# Patient Record
Sex: FEMALE | Race: WHITE | NOT HISPANIC OR LATINO | Employment: FULL TIME
[De-identification: names, ages, dates, MRNs, and addresses within clinical notes are randomized per-mention and may not be internally consistent; named-entity substitution may affect disease eponyms.]

---

## 2022-02-13 ENCOUNTER — HEALTH MAINTENANCE LETTER (OUTPATIENT)
Age: 62
End: 2022-02-13

## 2022-02-21 ENCOUNTER — OFFICE VISIT (OUTPATIENT)
Dept: INTERNAL MEDICINE | Facility: CLINIC | Age: 62
End: 2022-02-21
Payer: COMMERCIAL

## 2022-02-21 VITALS
BODY MASS INDEX: 44.16 KG/M2 | HEIGHT: 64 IN | RESPIRATION RATE: 20 BRPM | HEART RATE: 86 BPM | WEIGHT: 258.7 LBS | SYSTOLIC BLOOD PRESSURE: 137 MMHG | OXYGEN SATURATION: 100 % | DIASTOLIC BLOOD PRESSURE: 79 MMHG | TEMPERATURE: 97.1 F

## 2022-02-21 DIAGNOSIS — E03.9 ACQUIRED HYPOTHYROIDISM: ICD-10-CM

## 2022-02-21 DIAGNOSIS — Z00.00 PREVENTATIVE HEALTH CARE: Primary | ICD-10-CM

## 2022-02-21 DIAGNOSIS — D50.9 IRON DEFICIENCY ANEMIA, UNSPECIFIED IRON DEFICIENCY ANEMIA TYPE: ICD-10-CM

## 2022-02-21 DIAGNOSIS — E66.01 MORBID OBESITY (H): ICD-10-CM

## 2022-02-21 LAB
BASOPHILS # BLD AUTO: 0.1 10E3/UL (ref 0–0.2)
BASOPHILS NFR BLD AUTO: 1 %
EOSINOPHIL # BLD AUTO: 0.1 10E3/UL (ref 0–0.7)
EOSINOPHIL NFR BLD AUTO: 2 %
ERYTHROCYTE [DISTWIDTH] IN BLOOD BY AUTOMATED COUNT: 18.3 % (ref 10–15)
HCT VFR BLD AUTO: 35.8 % (ref 35–47)
HGB BLD-MCNC: 10.8 G/DL (ref 11.7–15.7)
LYMPHOCYTES # BLD AUTO: 2.4 10E3/UL (ref 0.8–5.3)
LYMPHOCYTES NFR BLD AUTO: 34 %
MCH RBC QN AUTO: 22.1 PG (ref 26.5–33)
MCHC RBC AUTO-ENTMCNC: 30.2 G/DL (ref 31.5–36.5)
MCV RBC AUTO: 73 FL (ref 78–100)
MONOCYTES # BLD AUTO: 0.5 10E3/UL (ref 0–1.3)
MONOCYTES NFR BLD AUTO: 7 %
NEUTROPHILS # BLD AUTO: 4.1 10E3/UL (ref 1.6–8.3)
NEUTROPHILS NFR BLD AUTO: 57 %
PLATELET # BLD AUTO: 386 10E3/UL (ref 150–450)
RBC # BLD AUTO: 4.89 10E6/UL (ref 3.8–5.2)
WBC # BLD AUTO: 7.1 10E3/UL (ref 4–11)

## 2022-02-21 PROCEDURE — 83550 IRON BINDING TEST: CPT | Performed by: INTERNAL MEDICINE

## 2022-02-21 PROCEDURE — 80050 GENERAL HEALTH PANEL: CPT | Performed by: INTERNAL MEDICINE

## 2022-02-21 PROCEDURE — 80061 LIPID PANEL: CPT | Performed by: INTERNAL MEDICINE

## 2022-02-21 PROCEDURE — 84439 ASSAY OF FREE THYROXINE: CPT | Performed by: INTERNAL MEDICINE

## 2022-02-21 PROCEDURE — 36415 COLL VENOUS BLD VENIPUNCTURE: CPT | Performed by: INTERNAL MEDICINE

## 2022-02-21 PROCEDURE — 82728 ASSAY OF FERRITIN: CPT | Performed by: INTERNAL MEDICINE

## 2022-02-21 PROCEDURE — 99386 PREV VISIT NEW AGE 40-64: CPT | Performed by: INTERNAL MEDICINE

## 2022-02-21 ASSESSMENT — ENCOUNTER SYMPTOMS
PALPITATIONS: 0
PARESTHESIAS: 0
DYSURIA: 0
EYE PAIN: 0
NAUSEA: 0
ABDOMINAL PAIN: 0
HEMATOCHEZIA: 0
BREAST MASS: 0
ARTHRALGIAS: 0
MYALGIAS: 0
HEADACHES: 0
SORE THROAT: 0
FEVER: 0
CHILLS: 0
HEARTBURN: 1
JOINT SWELLING: 0
COUGH: 0
DIARRHEA: 0
SHORTNESS OF BREATH: 0
FREQUENCY: 0
DIZZINESS: 0
HEMATURIA: 0
NERVOUS/ANXIOUS: 0
CONSTIPATION: 0
WEAKNESS: 0

## 2022-02-21 NOTE — PROGRESS NOTES
SUBJECTIVE:   CC: Taryn Salter is an 61 year old woman who presents for preventive health visit.     Non-fasting. Establish care.    Patient has been advised of split billing requirements and indicates understanding: Yes   Answers for HPI/ROS submitted by the patient on 2/21/2022  Frequency of exercise:: None  Getting at least 3 servings of Calcium per day:: Yes  Diet:: Regular (no restrictions)  Taking medications regularly:: Yes  Medication side effects:: None  Bi-annual eye exam:: NO  Dental care twice a year:: Yes  Sleep apnea or symptoms of sleep apnea:: None  abdominal pain: No  Blood in stool: No  Blood in urine: No  chest pain: No  chills: No  congestion: No  constipation: No  cough: No  diarrhea: No  dizziness: No  ear pain: No  eye pain: No  nervous/anxious: No  fever: No  frequency: No  genital sores: No  headaches: No  hearing loss: No  heartburn: Yes  arthralgias: No  joint swelling: No  peripheral edema: No  mood changes: No  myalgias: No  nausea: No  dysuria: No  palpitations: No  Skin sensation changes: No  sore throat: No  urgency: No  rash: No  shortness of breath: No  visual disturbance: No  weakness: No  pelvic pain: No  vaginal bleeding: No  vaginal discharge: No  tenderness: No  breast mass: No  breast discharge: No  Additional concerns today:: Yes      Today's PHQ-2 Score:   PHQ-2 ( 1999 [employer]) 2/21/2022   Q1: Little interest or pleasure in doing things 1   Q2: Feeling down, depressed or hopeless 1   PHQ-2 Score 2   Q1: Little interest or pleasure in doing things Several days   Q2: Feeling down, depressed or hopeless Several days   PHQ-2 Score 2       Abuse: Current or Past(Physical, Sexual or Emotional)- No  Do you feel safe in your environment? Yes    Have you ever done Advance Care Planning? (For example, a Health Directive, POLST, or a discussion with a medical provider or your loved ones about your wishes): No, advance care planning information given to patient to review.  Advanced  care planning was discussed at today's visit.    Social History     Tobacco Use     Smoking status: Never Smoker     Smokeless tobacco: Not on file   Substance Use Topics     Alcohol use: No     If you drink alcohol do you typically have >3 drinks per day or >7 drinks per week? No                     Reviewed orders with patient.  Reviewed health maintenance and updated orders accordingly - Yes  BP Readings from Last 3 Encounters:   02/21/22 137/79   08/29/13 113/63   07/13/13 130/81    Wt Readings from Last 3 Encounters:   02/21/22 117.3 kg (258 lb 11.2 oz)   08/29/13 129.5 kg (285 lb 7.9 oz)   07/10/13 136.1 kg (300 lb)                    Breast CA Risk Assessment (FHS-7) 2/21/2022   Do you have a family history of breast, colon, or ovarian cancer? No / Unknown         Mammogram Screening: Recommended mammography every 1-2 years with patient discussion and risk factor consideration  Pertinent mammograms are reviewed under the imaging tab.    Pertinent mammograms are reviewed under the imaging tab.  History of abnormal Pap smear: Status post benign hysterectomy. Health Maintenance and Surgical History updated.     Reviewed and updated as needed this visit by clinical staff                  Reviewed and updated as needed this visit by Provider                     ROS:  CONSTITUTIONAL: NEGATIVE for fever, chills, change in weight  INTEGUMENTARY/SKIN: NEGATIVE for worrisome rashes, moles or lesions  EYES: NEGATIVE for vision changes or irritation  ENT: NEGATIVE for ear, mouth and throat problems  RESP: NEGATIVE for significant cough or SOB  BREAST: NEGATIVE for masses, tenderness or discharge  CV: NEGATIVE for chest pain, palpitations or peripheral edema  GI: NEGATIVE for nausea, abdominal pain, heartburn, or change in bowel habits  : NEGATIVE for unusual urinary or vaginal symptoms. No vaginal bleeding.  MUSCULOSKELETAL: NEGATIVE for significant arthralgias or myalgia  NEURO: NEGATIVE for weakness, dizziness or  "paresthesias  PSYCHIATRIC: NEGATIVE for changes in mood or affect     OBJECTIVE:   There were no vitals taken for this visit.  EXAM:  GENERAL: healthy, alert and no distress  EYES: Eyes grossly normal to inspection, PERRL and conjunctivae and sclerae normal  NECK: no adenopathy, no asymmetry, masses, or scars and thyroid normal to palpation  RESP: lungs clear to auscultation - no rales, rhonchi or wheezes  BREAST: normal without masses, tenderness or nipple discharge and no palpable axillary masses or adenopathy  CV: regular rate and rhythm, normal S1 S2, no S3 or S4, no murmur, click or rub, no peripheral edema and peripheral pulses strong  ABDOMEN: soft, nontender, no hepatosplenomegaly, no masses and bowel sounds normal  MS: no gross musculoskeletal defects noted, no edema  SKIN: no suspicious lesions or rashes  NEURO: Normal strength and tone, mentation intact and speech normal  PSYCH: mentation appears normal, affect normal/bright      ASSESSMENT/PLAN:   (Z00.00) Preventative health care  (primary encounter diagnosis)  Comment:    Plan: CBC with platelets and differential, TSH with         free T4 reflex, Comprehensive metabolic panel         (BMP + Alb, Alk Phos, ALT, AST, Total. Bili,         TP), Lipid Profile (Chol, Trig, HDL, LDL calc),        *MA Screening Digital Bilateral, Adult Gastro         Ref - Procedure Only             (E03.9) Acquired hypothyroidism  Comment:    Plan:      (E66.01) Morbid obesity (H)  Comment:    Plan:      Patient has been advised of split billing requirements and indicates understanding: Yes  COUNSELING:   Reviewed preventive health counseling, as reflected in patient instructions       Regular exercise       Healthy diet/nutrition    Estimated body mass index is 48.98 kg/m  as calculated from the following:    Height as of 8/26/13: 1.626 m (5' 4.02\").    Weight as of 8/29/13: 129.5 kg (285 lb 7.9 oz).    Weight management plan: Patient referred to endocrine and/or weight " management specialty    She reports that she has never smoked. She does not have any smokeless tobacco history on file.      Counseling Resources:  ATP IV Guidelines  Pooled Cohorts Equation Calculator  Breast Cancer Risk Calculator  BRCA-Related Cancer Risk Assessment: FHS-7 Tool  FRAX Risk Assessment  ICSI Preventive Guidelines  Dietary Guidelines for Americans, 2010  USDA's MyPlate  ASA Prophylaxis  Lung CA Screening    Nehal Pitts MD  [facility]

## 2022-02-22 LAB
ALBUMIN SERPL-MCNC: 3.7 G/DL (ref 3.4–5)
ALP SERPL-CCNC: 155 U/L (ref 40–150)
ALT SERPL W P-5'-P-CCNC: 21 U/L (ref 0–50)
ANION GAP SERPL CALCULATED.3IONS-SCNC: 11 MMOL/L (ref 3–14)
AST SERPL W P-5'-P-CCNC: 12 U/L (ref 0–45)
BILIRUB SERPL-MCNC: 0.4 MG/DL (ref 0.2–1.3)
BUN SERPL-MCNC: 11 MG/DL (ref 7–30)
CALCIUM SERPL-MCNC: 9 MG/DL (ref 8.5–10.1)
CHLORIDE BLD-SCNC: 105 MMOL/L (ref 94–109)
CHOLEST SERPL-MCNC: 206 MG/DL
CO2 SERPL-SCNC: 21 MMOL/L (ref 20–32)
CREAT SERPL-MCNC: 0.53 MG/DL (ref 0.52–1.04)
FASTING STATUS PATIENT QL REPORTED: NO
FERRITIN SERPL-MCNC: 4 NG/ML (ref 8–252)
GFR SERPL CREATININE-BSD FRML MDRD: >90 ML/MIN/1.73M2
GLUCOSE BLD-MCNC: 119 MG/DL (ref 70–99)
HDLC SERPL-MCNC: 114 MG/DL
IRON SATN MFR SERPL: 4 % (ref 15–46)
IRON SERPL-MCNC: 20 UG/DL (ref 35–180)
LDLC SERPL CALC-MCNC: 79 MG/DL
NONHDLC SERPL-MCNC: 92 MG/DL
POTASSIUM BLD-SCNC: 3.6 MMOL/L (ref 3.4–5.3)
PROT SERPL-MCNC: 7.5 G/DL (ref 6.8–8.8)
SODIUM SERPL-SCNC: 137 MMOL/L (ref 133–144)
T4 FREE SERPL-MCNC: 0.97 NG/DL (ref 0.76–1.46)
TIBC SERPL-MCNC: 528 UG/DL (ref 240–430)
TRIGL SERPL-MCNC: 63 MG/DL
TSH SERPL DL<=0.005 MIU/L-ACNC: 5.92 MU/L (ref 0.4–4)

## 2022-02-24 ENCOUNTER — MYC MEDICAL ADVICE (OUTPATIENT)
Dept: INTERNAL MEDICINE | Facility: CLINIC | Age: 62
End: 2022-02-24
Payer: COMMERCIAL

## 2022-02-24 NOTE — TELEPHONE ENCOUNTER
"Levothyroxine is patient reported/historical.     From 2/21/2022 lab result note:     \"Please call her and let her know she is anemic and very deficient in Iron. She should start OTC iron tab ferrous sulfate and take them twice a day and recheck cbc in 3 months. She will likely need to be on the iron for a year to build up her iron stores. Labs have been ordered. her other labs were all within acceptable limits.\"    Najma BETANCOURT RN   [facility]      "

## 2022-02-24 NOTE — TELEPHONE ENCOUNTER
"Please see patient's most recent mychart message below regarding Levothyroxine dose.  States she hasn't taken the medication for \"quite awhile\".    Please e-scribe prescription.  "

## 2022-03-14 ENCOUNTER — HOSPITAL ENCOUNTER (OUTPATIENT)
Dept: MAMMOGRAPHY | Facility: CLINIC | Age: 62
Discharge: HOME | End: 2022-03-14
Attending: INTERNAL MEDICINE | Admitting: INTERNAL MEDICINE
Payer: COMMERCIAL

## 2022-03-14 DIAGNOSIS — Z00.00 PREVENTATIVE HEALTH CARE: ICD-10-CM

## 2022-03-14 PROCEDURE — 77067 SCR MAMMO BI INCL CAD: CPT

## 2022-10-16 ENCOUNTER — HEALTH MAINTENANCE LETTER (OUTPATIENT)
Age: 62
End: 2022-10-16

## 2023-03-26 ENCOUNTER — HEALTH MAINTENANCE LETTER (OUTPATIENT)
Age: 63
End: 2023-03-26

## 2024-06-01 ENCOUNTER — HEALTH MAINTENANCE LETTER (OUTPATIENT)
Age: 64
End: 2024-06-01

## 2024-07-22 ENCOUNTER — HOSPITAL ENCOUNTER (EMERGENCY)
Facility: CLINIC | Age: 64
Discharge: HOME | End: 2024-07-22
Attending: STUDENT IN AN ORGANIZED HEALTH CARE EDUCATION/TRAINING PROGRAM | Admitting: STUDENT IN AN ORGANIZED HEALTH CARE EDUCATION/TRAINING PROGRAM
Payer: COMMERCIAL

## 2024-07-22 ENCOUNTER — OFFICE VISIT (OUTPATIENT)
Dept: URGENT CARE | Facility: URGENT CARE | Age: 64
End: 2024-07-22
Payer: COMMERCIAL

## 2024-07-22 VITALS
BODY MASS INDEX: 46.69 KG/M2 | OXYGEN SATURATION: 98 % | DIASTOLIC BLOOD PRESSURE: 66 MMHG | SYSTOLIC BLOOD PRESSURE: 108 MMHG | RESPIRATION RATE: 14 BRPM | WEIGHT: 272 LBS | TEMPERATURE: 97.6 F | HEART RATE: 74 BPM

## 2024-07-22 VITALS
RESPIRATION RATE: 18 BRPM | BODY MASS INDEX: 46.44 KG/M2 | DIASTOLIC BLOOD PRESSURE: 76 MMHG | SYSTOLIC BLOOD PRESSURE: 143 MMHG | HEART RATE: 76 BPM | WEIGHT: 272 LBS | OXYGEN SATURATION: 100 % | TEMPERATURE: 98.1 F | HEIGHT: 64 IN

## 2024-07-22 DIAGNOSIS — R07.9 CHEST PAIN, UNSPECIFIED TYPE: Primary | ICD-10-CM

## 2024-07-22 DIAGNOSIS — R61 DIAPHORESIS: ICD-10-CM

## 2024-07-22 LAB
ANION GAP SERPL CALCULATED.3IONS-SCNC: 13 MMOL/L (ref 7–15)
ATRIAL RATE - MUSE: 82 BPM
BASOPHILS # BLD AUTO: 0 10E3/UL (ref 0–0.2)
BASOPHILS NFR BLD AUTO: 0 %
BUN SERPL-MCNC: 11.8 MG/DL (ref 8–23)
CALCIUM SERPL-MCNC: 9.3 MG/DL (ref 8.8–10.4)
CHLORIDE SERPL-SCNC: 100 MMOL/L (ref 98–107)
CREAT SERPL-MCNC: 0.56 MG/DL (ref 0.51–0.95)
DIASTOLIC BLOOD PRESSURE - MUSE: NORMAL MMHG
EGFRCR SERPLBLD CKD-EPI 2021: >90 ML/MIN/1.73M2
EOSINOPHIL # BLD AUTO: 0 10E3/UL (ref 0–0.7)
EOSINOPHIL NFR BLD AUTO: 0 %
ERYTHROCYTE [DISTWIDTH] IN BLOOD BY AUTOMATED COUNT: 12.9 % (ref 10–15)
GLUCOSE SERPL-MCNC: 112 MG/DL (ref 70–99)
HCO3 SERPL-SCNC: 24 MMOL/L (ref 22–29)
HCT VFR BLD AUTO: 45 % (ref 35–47)
HGB BLD-MCNC: 14.5 G/DL (ref 11.7–15.7)
HOLD SPECIMEN: NORMAL
HOLD SPECIMEN: NORMAL
IMM GRANULOCYTES # BLD: 0 10E3/UL
IMM GRANULOCYTES NFR BLD: 0 %
INTERPRETATION ECG - MUSE: NORMAL
LYMPHOCYTES # BLD AUTO: 1.1 10E3/UL (ref 0.8–5.3)
LYMPHOCYTES NFR BLD AUTO: 14 %
MCH RBC QN AUTO: 28.8 PG (ref 26.5–33)
MCHC RBC AUTO-ENTMCNC: 32.2 G/DL (ref 31.5–36.5)
MCV RBC AUTO: 89 FL (ref 78–100)
MONOCYTES # BLD AUTO: 0.5 10E3/UL (ref 0–1.3)
MONOCYTES NFR BLD AUTO: 6 %
NEUTROPHILS # BLD AUTO: 6.1 10E3/UL (ref 1.6–8.3)
NEUTROPHILS NFR BLD AUTO: 78 %
NRBC # BLD AUTO: 0 10E3/UL
NRBC BLD AUTO-RTO: 0 /100
P AXIS - MUSE: 52 DEGREES
PLATELET # BLD AUTO: 277 10E3/UL (ref 150–450)
POTASSIUM SERPL-SCNC: 4.6 MMOL/L (ref 3.4–5.3)
PR INTERVAL - MUSE: 106 MS
QRS DURATION - MUSE: 84 MS
QT - MUSE: 372 MS
QTC - MUSE: 434 MS
R AXIS - MUSE: 49 DEGREES
RBC # BLD AUTO: 5.04 10E6/UL (ref 3.8–5.2)
SODIUM SERPL-SCNC: 137 MMOL/L (ref 135–145)
SYSTOLIC BLOOD PRESSURE - MUSE: NORMAL MMHG
T AXIS - MUSE: 35 DEGREES
TROPONIN T SERPL HS-MCNC: <6 NG/L
VENTRICULAR RATE- MUSE: 82 BPM
WBC # BLD AUTO: 7.8 10E3/UL (ref 4–11)

## 2024-07-22 PROCEDURE — 93005 ELECTROCARDIOGRAM TRACING: CPT

## 2024-07-22 PROCEDURE — 80048 BASIC METABOLIC PNL TOTAL CA: CPT | Performed by: STUDENT IN AN ORGANIZED HEALTH CARE EDUCATION/TRAINING PROGRAM

## 2024-07-22 PROCEDURE — 99284 EMERGENCY DEPT VISIT MOD MDM: CPT

## 2024-07-22 PROCEDURE — 93000 ELECTROCARDIOGRAM COMPLETE: CPT | Performed by: PHYSICIAN ASSISTANT

## 2024-07-22 PROCEDURE — 36415 COLL VENOUS BLD VENIPUNCTURE: CPT | Performed by: STUDENT IN AN ORGANIZED HEALTH CARE EDUCATION/TRAINING PROGRAM

## 2024-07-22 PROCEDURE — 84484 ASSAY OF TROPONIN QUANT: CPT | Performed by: STUDENT IN AN ORGANIZED HEALTH CARE EDUCATION/TRAINING PROGRAM

## 2024-07-22 PROCEDURE — 85025 COMPLETE CBC W/AUTO DIFF WBC: CPT | Performed by: STUDENT IN AN ORGANIZED HEALTH CARE EDUCATION/TRAINING PROGRAM

## 2024-07-22 PROCEDURE — 99215 OFFICE O/P EST HI 40 MIN: CPT | Performed by: PHYSICIAN ASSISTANT

## 2024-07-22 ASSESSMENT — ACTIVITIES OF DAILY LIVING (ADL): ADLS_ACUITY_SCORE: 35

## 2024-07-22 NOTE — PROGRESS NOTES
URGENT CARE VISIT:    SUBJECTIVE:   Taryn Salter is a 63 year old female who presents for intense chest pain an hour ago. It lasted about 5 minutes. It was accompanied by sob, nausea, and sweating. She has had heartburn in the past and it felt different. Symptoms have improved. She took blood pressure and it was low at home. No treatments tried.     PMH:   Past Medical History:   Diagnosis Date    Thyroid disease     hypothyroidism    Uterine fibroid      Allergies: Tetracycline  Medications:   Current Outpatient Medications   Medication Sig Dispense Refill    LEVOTHYROXINE SODIUM PO Take 100 mcg by mouth every morning.       Social History:   Social History     Tobacco Use    Smoking status: Never    Smokeless tobacco: Never   Substance Use Topics    Alcohol use: No       ROS: ROS otherwise found to be negative except as noted above.     OBJECTIVE:  /66 (BP Location: Right arm, Patient Position: Chair, Cuff Size: Adult Large)   Pulse 74   Temp 97.6  F (36.4  C) (Oral)   Resp 14   Wt 123.4 kg (272 lb)   LMP 07/01/2013   SpO2 98%   BMI 46.69 kg/m    General: WDWN in NAD  Eyes: EOMI,  PERRL, conjunctiva clear  Neck: Supple, non-tender  Cardiac: RRR without murmurs, rubs, or gallops.  Respiratory: LCTAB without adventitious sounds. Non-labored breathing.  Extremities: No peripheral edema or tenderness, peripheral pulses normal  Musculoskeletal: No gross deformities noted, no erythema, FROM noted in all extremities  Neuro: alert and oriented, normal speech  Integumentary: No suspicious rashes or lesions.   Psych: normal mood and affect      ASSESSMENT:     ICD-10-CM    1. Chest pain, unspecified type  R07.9 EKG 12-lead complete w/read - Clinics      2. Diaphoresis  R61            PLAN:  40 minutes spent by me on the date of the encounter doing chart review, review of outside records, review of test results, interpretation of tests, patient visit, and documentation   Patient Instructions   Sent to ER for  evaluation of intense chest pain, nausea, and sweats since an hour ago. Ddx includes MI, angina, PE, costochondritis, GERD, among others. EKG showed normal sinus rhythm. She is vitally stable.  Patient verbalized understanding and is agreeable to plan. The patient was discharged ambulatory and in stable condition.    Penelope Dhaliwal PA-C ....................  7/22/2024   4:59 PM

## 2024-07-22 NOTE — ED TRIAGE NOTES
Pt presents to ED with chest pain.   Pt works from home and while working, Around 10:45, felt like she had heartburn and it progressively got worse. Had a hard time breathing due to the pain.   Pain subsided after about 5 minutes. She was then feeling shaky, and still didn't feel right, so she decided to go to urgent care. EKG at  normal.   Denies pain at this time. Has not had any since that.   Sent by  for further work up.

## 2024-07-22 NOTE — PATIENT INSTRUCTIONS
Sent to ER for evaluation of intense chest pain, nausea, and sweats since an hour ago. Ddx includes MI, angina, PE, costochondritis, GERD, among others. EKG showed normal sinus rhythm. She is vitally stable.

## 2024-07-22 NOTE — ED PROVIDER NOTES
"  Emergency Department Note      History of Present Illness     Chief Complaint   Chest Pain      HPI   Taryn Salter is a delightful 63 year old female presenting with chest pain. Kallie reports she was working from home today and around 1040 she developed gradually worsening midsternum chest pain that lasted about 5 minutes. She describes it as intense and like heart burn but not exactly the same. Endorses shortness of breath, nausea, diaphoresis, and diffuse tingling during the worst of the pain. Her blood pressure at home was 89/68 at that time. She took 2 ibuprofen and the symptoms subsided, but she continued to feel shaky and off. She has not experienced the chest pain since. Denies back pain, abdominal pain, lower extremity edema, or vomiting. No recent fever, chills, or cough. No history of cardiac problems, hypertension, hyperlipidemia, or smoking. No family history of young cardiac disease or death.    Independent Historian   None    Review of External Notes   I personally reviewed notes from the patient's urgent care visit dated today. This provided me with information regarding patient's initial presentation at urgent care.     I personally reviewed the patient's chart, including available medication list and available past medical history, past surgical history, family history, and social history.    Physical Exam     Patient Vitals for the past 24 hrs:   BP Temp Temp src Pulse Resp SpO2 Height Weight   07/22/24 1242 (!) 168/62 -- -- 79 18 100 % 1.626 m (5' 4\") --   07/22/24 1241 -- 98.1  F (36.7  C) Temporal -- -- -- -- 123.4 kg (272 lb)      Physical Exam   General: Alert, female, appearing stated age, sitting in chair, conversant  HENT: Moist mucous membranes  Eyes: No scleral icterus, no pallor  Neck: No jugular venous distension  Cardiovascular: Regular rate and rhythm, S1, S2, no murmurs, gallops or rubs. 2+ radial pulses bilaterally.   Pulmonary: Easy work of breathing, lungs clear to auscultation " bilaterally, no rales, rhonchi, wheeze  Chest: No chest wall tenderness to palpation  Abdominal: Soft, non-tender  Skin: Warm and dry, no pallor, no rash    Diagnostics     Lab Results   Labs Ordered and Resulted from Time of ED Arrival to Time of ED Departure   BASIC METABOLIC PANEL - Abnormal       Result Value    Sodium 137      Potassium 4.6      Chloride 100      Carbon Dioxide (CO2) 24      Anion Gap 13      Urea Nitrogen 11.8      Creatinine 0.56      GFR Estimate >90      Calcium 9.3      Glucose 112 (*)    TROPONIN T, HIGH SENSITIVITY - Normal    Troponin T, High Sensitivity <6     CBC WITH PLATELETS AND DIFFERENTIAL    WBC Count 7.8      RBC Count 5.04      Hemoglobin 14.5      Hematocrit 45.0      MCV 89      MCH 28.8      MCHC 32.2      RDW 12.9      Platelet Count 277      % Neutrophils 78      % Lymphocytes 14      % Monocytes 6      % Eosinophils 0      % Basophils 0      % Immature Granulocytes 0      NRBCs per 100 WBC 0      Absolute Neutrophils 6.1      Absolute Lymphocytes 1.1      Absolute Monocytes 0.5      Absolute Eosinophils 0.0      Absolute Basophils 0.0      Absolute Immature Granulocytes 0.0      Absolute NRBCs 0.0         Imaging   No orders to display       EKG  ECG results from 07/22/24   EKG 12 lead     Value    Systolic Blood Pressure     Diastolic Blood Pressure     Ventricular Rate 82    Atrial Rate 82    MD Interval 106    QRS Duration 84        QTc 434    P Axis 52    R AXIS 49    T Axis 35    Interpretation ECG      Sinus rhythm with short MD with Premature supraventricular complexes  Otherwise normal ECG  When compared with ECG of 30-MAY-2002 11:09,  Premature supraventricular complexes are now Present  Read by Dr. Garduno [id number]           Independent Interpretation   See ED Course below    ED Course      Medications Administered   Medications - No data to display    Procedures   Procedures   None performed    Discussion of Management   See ED Course below    Social  Determinants of Health adding to complexity of care   None.      ED Course   Independent Interpretation / Discussion of Management / Repeat Assessments  ED Course as of 07/22/24 1445   Mon Jul 22, 2024   1324 I obtained history and examined the patient as noted above.    1444 I rechecked the patient and explained findings. We discussed plans for discharge and the patient is comfortable with this plan.        Medical Decision Making / Diagnosis     CMS Diagnoses: None    MIPS       None    MDM   Patient presenting with chest pain.  Vital signs reassuring.  Considered differential including acute coronary syndrome, anginal pain, GERD, anxiety, cardiac arrhythmia, among others.  Work appears reassuring.  CT shows no acute appearing ischemic changes.  Troponin is undetectable.  No anemia electrolyte derangement.  Overall reassuring workup.  Patient's heart score is 2.  I feel that discharge with outpatient follow-up is appropriate.  Recommended patient follow-up with primary care clinician for reevaluation. Findings were discussed.  Additional verbal instructions were provided.  I discussed specific warning signs and instructed the patient to return to the emergency department if there are any concerns. Understanding of instructions was voiced, questions were answered and the patient was discharged.        Disposition   The patient was discharged.     Diagnosis     ICD-10-CM    1. Chest pain, unspecified type  R07.9            Discharge Medications   New Prescriptions    No medications on file         Scribe Disclosure:  I, Veronica Nilam, am serving as a scribe at 1:31 PM on 7/22/2024 to document services personally performed by Dave Garduno MD based on my observations and the provider's statements to me.      Dave Garduno MD  07/22/24 0391

## 2025-06-14 ENCOUNTER — HEALTH MAINTENANCE LETTER (OUTPATIENT)
Age: 65
End: 2025-06-14